# Patient Record
Sex: MALE | Race: WHITE
[De-identification: names, ages, dates, MRNs, and addresses within clinical notes are randomized per-mention and may not be internally consistent; named-entity substitution may affect disease eponyms.]

---

## 2018-06-04 ENCOUNTER — HOSPITAL ENCOUNTER (OUTPATIENT)
Dept: HOSPITAL 17 - PHED | Age: 58
Setting detail: OBSERVATION
LOS: 1 days | Discharge: HOME | End: 2018-06-05
Attending: HOSPITALIST | Admitting: HOSPITALIST
Payer: OTHER GOVERNMENT

## 2018-06-04 VITALS — WEIGHT: 171.96 LBS | HEIGHT: 68 IN | BODY MASS INDEX: 26.06 KG/M2

## 2018-06-04 VITALS
SYSTOLIC BLOOD PRESSURE: 141 MMHG | DIASTOLIC BLOOD PRESSURE: 86 MMHG | OXYGEN SATURATION: 97 % | RESPIRATION RATE: 18 BRPM | HEART RATE: 72 BPM

## 2018-06-04 VITALS
DIASTOLIC BLOOD PRESSURE: 78 MMHG | RESPIRATION RATE: 16 BRPM | HEART RATE: 68 BPM | OXYGEN SATURATION: 96 % | SYSTOLIC BLOOD PRESSURE: 143 MMHG

## 2018-06-04 VITALS
RESPIRATION RATE: 16 BRPM | DIASTOLIC BLOOD PRESSURE: 77 MMHG | SYSTOLIC BLOOD PRESSURE: 119 MMHG | OXYGEN SATURATION: 97 % | HEART RATE: 73 BPM

## 2018-06-04 VITALS
SYSTOLIC BLOOD PRESSURE: 132 MMHG | RESPIRATION RATE: 18 BRPM | HEART RATE: 87 BPM | DIASTOLIC BLOOD PRESSURE: 81 MMHG | OXYGEN SATURATION: 98 %

## 2018-06-04 VITALS
RESPIRATION RATE: 18 BRPM | SYSTOLIC BLOOD PRESSURE: 118 MMHG | TEMPERATURE: 98.4 F | DIASTOLIC BLOOD PRESSURE: 70 MMHG | OXYGEN SATURATION: 98 % | HEART RATE: 83 BPM

## 2018-06-04 VITALS — HEART RATE: 77 BPM

## 2018-06-04 VITALS
RESPIRATION RATE: 20 BRPM | HEART RATE: 72 BPM | DIASTOLIC BLOOD PRESSURE: 76 MMHG | SYSTOLIC BLOOD PRESSURE: 137 MMHG | TEMPERATURE: 96.5 F | OXYGEN SATURATION: 97 %

## 2018-06-04 VITALS — OXYGEN SATURATION: 96 %

## 2018-06-04 DIAGNOSIS — F41.9: ICD-10-CM

## 2018-06-04 DIAGNOSIS — R01.1: ICD-10-CM

## 2018-06-04 DIAGNOSIS — R42: ICD-10-CM

## 2018-06-04 DIAGNOSIS — R06.02: ICD-10-CM

## 2018-06-04 DIAGNOSIS — E03.9: ICD-10-CM

## 2018-06-04 DIAGNOSIS — R07.89: Primary | ICD-10-CM

## 2018-06-04 DIAGNOSIS — R20.0: ICD-10-CM

## 2018-06-04 DIAGNOSIS — R11.0: ICD-10-CM

## 2018-06-04 DIAGNOSIS — K21.9: ICD-10-CM

## 2018-06-04 DIAGNOSIS — E78.5: ICD-10-CM

## 2018-06-04 DIAGNOSIS — Z79.899: ICD-10-CM

## 2018-06-04 DIAGNOSIS — F32.9: ICD-10-CM

## 2018-06-04 LAB
ALBUMIN SERPL-MCNC: 4 GM/DL (ref 3.4–5)
ALP SERPL-CCNC: 103 U/L (ref 45–117)
ALT SERPL-CCNC: 45 U/L (ref 12–78)
AST SERPL-CCNC: 24 U/L (ref 15–37)
BASOPHILS # BLD AUTO: 0.1 TH/MM3 (ref 0–0.2)
BASOPHILS NFR BLD: 1.2 % (ref 0–2)
BILIRUB SERPL-MCNC: 0.5 MG/DL (ref 0.2–1)
BUN SERPL-MCNC: 10 MG/DL (ref 7–18)
CALCIUM SERPL-MCNC: 8.7 MG/DL (ref 8.5–10.1)
CHLORIDE SERPL-SCNC: 105 MEQ/L (ref 98–107)
CREAT SERPL-MCNC: 0.94 MG/DL (ref 0.6–1.3)
D-DIMER: (no result) MG/L FEU (ref 0–0.5)
EOSINOPHIL # BLD: 0.2 TH/MM3 (ref 0–0.4)
EOSINOPHIL NFR BLD: 2.7 % (ref 0–4)
ERYTHROCYTE [DISTWIDTH] IN BLOOD BY AUTOMATED COUNT: 12.6 % (ref 11.6–17.2)
GFR SERPLBLD BASED ON 1.73 SQ M-ARVRAT: 82 ML/MIN (ref 89–?)
GLUCOSE SERPL-MCNC: 85 MG/DL (ref 74–106)
HCO3 BLD-SCNC: 25.6 MEQ/L (ref 21–32)
HCT VFR BLD CALC: 45.9 % (ref 39–51)
HGB BLD-MCNC: 16.1 GM/DL (ref 13–17)
INR PPP: 1 RATIO
LYMPHOCYTES # BLD AUTO: 2.8 TH/MM3 (ref 1–4.8)
LYMPHOCYTES NFR BLD AUTO: 40 % (ref 9–44)
MCH RBC QN AUTO: 31.6 PG (ref 27–34)
MCHC RBC AUTO-ENTMCNC: 35 % (ref 32–36)
MCV RBC AUTO: 90.1 FL (ref 80–100)
MONOCYTE #: 0.8 TH/MM3 (ref 0–0.9)
MONOCYTES NFR BLD: 10.7 % (ref 0–8)
NEUTROPHILS # BLD AUTO: 3.2 TH/MM3 (ref 1.8–7.7)
NEUTROPHILS NFR BLD AUTO: 45.4 % (ref 16–70)
PLATELET # BLD: 275 TH/MM3 (ref 150–450)
PMV BLD AUTO: 8.6 FL (ref 7–11)
PROT SERPL-MCNC: 7.9 GM/DL (ref 6.4–8.2)
PROTHROMBIN TIME: 10.5 SEC (ref 9.8–11.6)
RBC # BLD AUTO: 5.09 MIL/MM3 (ref 4.5–5.9)
SODIUM SERPL-SCNC: 138 MEQ/L (ref 136–145)
TROPONIN I SERPL-MCNC: (no result) NG/ML (ref 0.02–0.05)
WBC # BLD AUTO: 7.1 TH/MM3 (ref 4–11)

## 2018-06-04 PROCEDURE — 93005 ELECTROCARDIOGRAM TRACING: CPT

## 2018-06-04 PROCEDURE — 85610 PROTHROMBIN TIME: CPT

## 2018-06-04 PROCEDURE — G0378 HOSPITAL OBSERVATION PER HR: HCPCS

## 2018-06-04 PROCEDURE — 82550 ASSAY OF CK (CPK): CPT

## 2018-06-04 PROCEDURE — 85379 FIBRIN DEGRADATION QUANT: CPT

## 2018-06-04 PROCEDURE — 82552 ASSAY OF CPK IN BLOOD: CPT

## 2018-06-04 PROCEDURE — 85025 COMPLETE CBC W/AUTO DIFF WBC: CPT

## 2018-06-04 PROCEDURE — 83690 ASSAY OF LIPASE: CPT

## 2018-06-04 PROCEDURE — 84484 ASSAY OF TROPONIN QUANT: CPT

## 2018-06-04 PROCEDURE — 99285 EMERGENCY DEPT VISIT HI MDM: CPT

## 2018-06-04 PROCEDURE — 80307 DRUG TEST PRSMV CHEM ANLYZR: CPT

## 2018-06-04 PROCEDURE — 80053 COMPREHEN METABOLIC PANEL: CPT

## 2018-06-04 PROCEDURE — 93017 CV STRESS TEST TRACING ONLY: CPT

## 2018-06-04 PROCEDURE — 85730 THROMBOPLASTIN TIME PARTIAL: CPT

## 2018-06-04 PROCEDURE — 83880 ASSAY OF NATRIURETIC PEPTIDE: CPT

## 2018-06-04 PROCEDURE — 71045 X-RAY EXAM CHEST 1 VIEW: CPT

## 2018-06-04 RX ADMIN — Medication SCH ML: at 22:33

## 2018-06-04 NOTE — RADRPT
EXAM DATE:  6/4/2018 4:46 PM EDT

AGE/SEX:        57 years / Male



INDICATIONS:  Substernal chest pain.



CLINICAL DATA:  This is the patient's initial encounter. Patient reports that signs and symptoms have
 been present for 1 day and indicates a pain score of 4/10. 

                                                                          

MEDICAL/SURGICAL HISTORY:       None. None.



COMPARISON:  No prior studies currently available.



FINDINGS:  

A single AP view of the chest demonstrates the lungs to be symmetrically aerated without evidence of 
mass, infiltrate or effusion.  The cardiomediastinal contours are unremarkable.  Osseous structures a
re intact.  





CONCLUSION: 

No acute disease



Electronically signed by: Jamie Daniels MD  6/4/2018 5:06 PM EDT

## 2018-06-04 NOTE — PD
HPI


Time Seen by Provider:  16:03


Travel History


International Travel<30 days:  No


Contact w/Intl Traveler<30days:  No


Traveled to known affect area:  No





History of Present Illness


HPI


Patient comes in complaining of substernal chest tightness radiating to his 

right upper extremity and associated with lightheadedness originally occurred 2 

weeks ago.  But it resolved spontaneously.  This episode returned at about 10 

AM today and has been constant (rated 6 out of 10 )ever since which prompted 

the patient to come into the emergency department for further evaluation after 

calling his cardiologist who recommended the same.  Patient denies any 

alleviating or aggravating factors.  Patient denies any associated factors such 

as fever, rash, sore throat, cough, shortness of breath, nausea, vomiting, 

diarrhea, abdominal pain.





pcp stephanie ocampo


States allergy to Septra


Past medical history significant for hypothyroidism, hernia repair, back surgery

, appendectomy, GERD, anxiety, hypercholesterolemia.





PFSH


Past Medical History


Hx Anticoagulant Therapy:  No


Anxiety:  Yes


Cancer:  No


Cardiovascular Problems:  No


Chemotherapy:  No


Cerebrovascular Accident:  No


Diabetes:  No


Diminished Hearing:  No


GERD:  Yes


Hepatitis:  No


Hiatal Hernia:  No


Hypertension:  No


Respiratory:  No


Thyroid Disease:  Yes





Past Surgical History


Abdominal Surgery:  Yes (HERNIA REPAIR)


Appendectomy:  Yes


Cardiac Surgery:  No


Ear Surgery:  No


Eye Surgery:  No


Genitourinary Surgery:  No


Hysterectomy:  No


Neurologic Surgery:  Yes (RECENT BACK SURGERY)


Oral Surgery:  No


Pacemaker:  No


Thoracic Surgery:  No


Other Surgery:  Yes (BACK SURGERY ABD HERNIA REPAIR)





Social History


Alcohol Use:  No


Tobacco Use:  No


Substance Use:  No





Allergies-Medications


(Allergen,Severity, Reaction):  


Coded Allergies:  


     Sulfa (Sulfonamide Antibiotics) (Unverified  Allergy, Severe, 6/4/18)


     trimethoprim (Unverified  Allergy, Severe, 6/4/18)


Uncoded Allergies:  


     NNKA (Allergy, Unknown, 9/9/03)


     SEPTRA (Allergy, Unknown, 9/9/03)


Reported Meds & Prescriptions





Reported Meds & Active Scripts


Active


Reported


Niacin 500 Mg Tab 500 Mg PO DAILY


Omega-3 Fish Oil/Vitamin (Fish Oil-Cholecalciferol) 1,000-1,000 Mg Cap 1 Cap PO 

DAILY


Vitamin E 200 Unit Cap 400 Units PO HS


Probiotic (Lactobacillus Acidophilus) 10 Billion Cell Cap 1 Cap PO HS


Potassium Gluconate 595 Mg (99 Mg) Tab 1 Tab PO HS


Multi-Vitamin Daily (Multiple Vitamin) 1 Tab Tab 1 Tab PO DAILY


Refresh Optive Sensitive Unit-Dose Opth Drops (Carboxymethylcellulose-Glycerine 

Opth Drops) 0.5-0.9% Drops 1-2 Drop EACH EYE Q6HR PRN


[place]away Opth Drops (Ketotifen Opth Drops) 0.025% Drops 1 Drop EACH EYE BID


Linzess (Linaclotide) 290 Mcg Cap 290 Mcg PO DAILY


Zofran (Ondansetron HCl) 4 Mg Tab 4 Mg PO Q8HR PRN


Lovastatin 20 Mg Tab 20 Mg PO HS


Venlafaxine ER 24 HR (Venlafaxine HCl) 150 Mg Cap 150 Mg PO DAILY


Lorazepam 0.5 Mg Tab 0.5 Mg PO Q8H PRN


Hydroxyzine HCl 25 Mg Tab 25 Mg PO HS PRN


Bethanechol 25 Mg Tab 25 Mg PO Q8HR


Oscimin (Hyoscyamine Sulfate) 0.125 Mg Sub 0.125 Mg PO Q6HR PRN


Levothyroxine (Levothyroxine Sodium) 100 Mcg Tab 100 Mcg PO DAILY


Omeprazole 20 Mg Tab 40 Mg PO DAILY








Review of Systems


General / Constitutional:  No: Fever


Eyes:  No: Visual changes


HENT:  No: Headaches


Cardiovascular:  Positive: Chest Pain or Discomfort


Respiratory:  No: Shortness of Breath


Gastrointestinal:  No: Abdominal Pain


Genitourinary:  No: Dysuria


Musculoskeletal:  No: Pain


Skin:  No Rash


Neurologic:  No: Weakness


Psychiatric:  No: Depression


Endocrine:  No: Polydipsia


Hematologic/Lymphatic:  No: Easy Bruising





Physical Exam


Narrative


GENERAL: Well-nourished, well-developed patient in no apparent distress.


SKIN: Warm and dry.


HEAD: Atraumatic. Normocephalic. 


EYES: Pupils equal and round. No scleral icterus. No injection or drainage. 


ENT: No nasal bleeding or discharge.  Mucous membranes pink and moist.


NECK: Trachea midline. No JVD. 


CARDIOVASCULAR: Regular rate and rhythm.  no rubs or gallops   


RESPIRATORY: No accessory muscle use. Clear to auscultation. Breath sounds 

equal bilaterally. 


GASTROINTESTINAL: Abdomen soft, non-tender, nondistended. No rebound or 

guarding 


MUSCULOSKELETAL: Extremities without clubbing, cyanosis, or edema. No obvious 

deformities. 


NEUROLOGICAL: Awake and alert. No obvious cranial nerve deficits.  Motor 

grossly within normal limits. Five out of 5 muscle strength in the arms and 

legs.  Normal speech.


PSYCHIATRIC: Appropriate mood and affect; insight and judgment normal.





Data


Data


Last Documented VS





Vital Signs








  Date Time  Temp Pulse Resp B/P (MAP) Pulse Ox O2 Delivery O2 Flow Rate FiO2


 


6/4/18 16:28      Room Air  


 


6/4/18 16:28     98   


 


6/4/18 16:23  87 18     


 


6/4/18 16:15 98.4       








Orders





 Orders


Electrocardiogram (6/4/18 16:03)


B-Type Natriuretic Peptide (6/4/18 16:03)


Ckmb (Isoenzyme) Profile (6/4/18 16:03)


Complete Blood Count With Diff (6/4/18 16:03)


Comprehensive Metabolic Panel (6/4/18 16:03)


D-Dimer (6/4/18 16:03)


Prothrombin Time / Inr (Pt) (6/4/18 16:03)


Act Partial Throm Time (Ptt) (6/4/18 16:03)


Troponin I (6/4/18 16:03)


Lipase (6/4/18 16:03)


Chest, Single Ap (6/4/18 16:03)


Ecg Monitoring (6/4/18 16:03)


Iv Access Insert/Monitor (6/4/18 16:03)


Oximetry (6/4/18 16:03)


Oxygen Administration (6/4/18 16:03)


Sodium Chloride 0.9% Flush (Ns Flush) (6/4/18 16:15)


Drug Screen, Random Urine (6/4/18 16:03)


Alcohol (Ethanol) (6/4/18 16:03)


Aspirin Chew (Aspirin Chew) (6/4/18 16:30)


Morphine Inj (Morphine Inj) (6/4/18 16:30)


Nitroglycerin 2% Oint (Nitroglycerin 2% (6/4/18 16:30)


CKMB (6/4/18 16:10)


CKMB% (6/4/18 16:10)





Labs





Laboratory Tests








Test


  6/4/18


16:10 6/4/18


17:20


 


White Blood Count 7.1 TH/MM3  


 


Red Blood Count 5.09 MIL/MM3  


 


Hemoglobin 16.1 GM/DL  


 


Hematocrit 45.9 %  


 


Mean Corpuscular Volume 90.1 FL  


 


Mean Corpuscular Hemoglobin 31.6 PG  


 


Mean Corpuscular Hemoglobin


Concent 35.0 % 


  


 


 


Red Cell Distribution Width 12.6 %  


 


Platelet Count 275 TH/MM3  


 


Mean Platelet Volume 8.6 FL  


 


Neutrophils (%) (Auto) 45.4 %  


 


Lymphocytes (%) (Auto) 40.0 %  


 


Monocytes (%) (Auto) 10.7 %  


 


Eosinophils (%) (Auto) 2.7 %  


 


Basophils (%) (Auto) 1.2 %  


 


Neutrophils # (Auto) 3.2 TH/MM3  


 


Lymphocytes # (Auto) 2.8 TH/MM3  


 


Monocytes # (Auto) 0.8 TH/MM3  


 


Eosinophils # (Auto) 0.2 TH/MM3  


 


Basophils # (Auto) 0.1 TH/MM3  


 


CBC Comment DIFF FINAL  


 


Differential Comment   


 


Prothrombin Time 10.5 SEC  


 


Prothromb Time International


Ratio 1.0 RATIO 


  


 


 


Activated Partial


Thromboplast Time 25.0 SEC 


  


 


 


D-Dimer Quantitative (PE/DVT)


  LESS THAN 0.19


MG/L FEU 


 


 


Blood Urea Nitrogen 10 MG/DL  


 


Creatinine 0.94 MG/DL  


 


Random Glucose 85 MG/DL  


 


Total Protein 7.9 GM/DL  


 


Albumin 4.0 GM/DL  


 


Calcium Level 8.7 MG/DL  


 


Alkaline Phosphatase 103 U/L  


 


Aspartate Amino Transf


(AST/SGOT) 24 U/L 


  


 


 


Alanine Aminotransferase


(ALT/SGPT) 45 U/L 


  


 


 


Total Bilirubin 0.5 MG/DL  


 


Sodium Level 138 MEQ/L  


 


Potassium Level 3.6 MEQ/L  


 


Chloride Level 105 MEQ/L  


 


Carbon Dioxide Level 25.6 MEQ/L  


 


Anion Gap 7 MEQ/L  


 


Estimat Glomerular Filtration


Rate 82 ML/MIN 


  


 


 


Total Creatine Kinase 132 U/L  


 


Creatine Kinase MB 1.0 NG/ML  


 


Troponin I


  LESS THAN 0.02


NG/ML 


 


 


B-Type Natriuretic Peptide 8 PG/ML  


 


Lipase 153 U/L  


 


Ethyl Alcohol Level


  LESS THAN 3


MG/DL 


 


 


Urine Barbiturates Screen  NEG 


 


Urine Amphetamines Screen  NEG 


 


Urine Benzodiazepines Screen  NEG 











MDM


Medical Decision Making


Medical Screen Exam Complete:  Yes


Emergency Medical Condition:  Yes


Medical Record Reviewed:  Yes


Interpretation(s)


Pulse ox with excellent pleth wave, on room air, rates between 97 and 100 which 

is within normal limits and does not suggest any hypoxemia





EKG shows sinus rhythm, 78 bpm, normal intervals, no evidence of any ST 

elevation MI pattern, however questionable ST-T changes in inferior leads, 

difficult to assess due to motion artifact


Differential Diagnosis


STEMI versus non-STEMI versus pulmonary embolus versus pericardial effusion 

versus pleural effusion versus pneumonia


Narrative Course


No leukocytosis, no anemia, normal platelet count, no left shift, coagulation 

profile within normal limits


D-dimer negative


Toxicology screen negative including alcohol


Electrodes are all within normal limits, normal kidney liver and pancreatic 

functions.


First set of cardiac enzymes negative





Diagnosis





 Primary Impression:  


 Chest pain rule out MI





Admitting Information


Admitting Physician Requests:  Tenzin Del Rio MD Jun 4, 2018 16:03

## 2018-06-05 VITALS
HEART RATE: 73 BPM | SYSTOLIC BLOOD PRESSURE: 121 MMHG | RESPIRATION RATE: 20 BRPM | TEMPERATURE: 96.6 F | OXYGEN SATURATION: 98 % | DIASTOLIC BLOOD PRESSURE: 70 MMHG

## 2018-06-05 VITALS
RESPIRATION RATE: 20 BRPM | SYSTOLIC BLOOD PRESSURE: 126 MMHG | HEART RATE: 75 BPM | OXYGEN SATURATION: 97 % | TEMPERATURE: 96.8 F | DIASTOLIC BLOOD PRESSURE: 74 MMHG

## 2018-06-05 VITALS
TEMPERATURE: 97 F | SYSTOLIC BLOOD PRESSURE: 130 MMHG | HEART RATE: 71 BPM | OXYGEN SATURATION: 97 % | RESPIRATION RATE: 19 BRPM | DIASTOLIC BLOOD PRESSURE: 76 MMHG

## 2018-06-05 RX ADMIN — Medication SCH ML: at 09:06

## 2018-06-05 NOTE — EKG
Date Performed: 2018       Time Performed: 19:32:48

 

PTAGE:      57 years

 

EKG:      Sinus rhythm 

 

 NORMAL ECG Since the 

 

 PREVIOUS TRACING            , no significant change noted PREVIOUS TRACIN2018 16.05

 

DOCTOR:   Leidy Corbett  Interpretating Date/Time  2018 16:13:11

## 2018-06-05 NOTE — EKG
Date Performed: 2018       Time Performed: 22:17:15

 

PTAGE:      57 years

 

EKG:      Sinus rhythm 

 

 NORMAL ECG Since the 

 

 PREVIOUS TRACING            , no significant change noted PREVIOUS TRACIN2018 19.32

 

DOCTOR:   Leidy Corbett  Interpretating Date/Time  2018 16:13:21

## 2018-06-05 NOTE — EKG
Date Performed: 2018       Time Performed: 16:05:37

 

PTAGE:      57 years

 

EKG:      Sinus rhythm 

 

 NORMAL ECG INTERPRETATION BASED ON A DEFAULT AGE OF 40 YEARS Since the 

 

 PREVIOUS TRACING            , no significant change noted PREVIOUS TRACIN2014 15.48

 

DOCTOR:   Leidy Corbett  Interpretating Date/Time  2018 16:13:00

## 2018-06-05 NOTE — HHI.HP
__________________________________________________





HPI


Service


[facility]


Primary Care Physician


Rufus Null MD (Paul)


Admission Diagnosis





CP R/O MI


Diagnoses:  


(1) Chest pain


Diagnosis:  Principal





Chief Complaint:  


Chest pain


Travel History


International Travel<30 Days:  No


Contact w/Intl Traveler <30 Da:  No


Traveled to Known Affected Are:  No


History of Present Illness


57-year-old  male with known history of hyperlipidemia, anxiety who 

presented to the hospital because of chest pain.  Patient states that he has 

been having episodes of chest discomfort 1 approximately 10 days ago and then 

another one yesterday morning at 10 AM where he describes it as a heaviness in 

the middle part of his chest which is 7/10 on a pain scale and radiated up to 

his right arm where he was developing some numbness type sensation whenever he 

squeezes fist.  He had associated nausea, shortness of breath, lightheadedness.

  Patient denied any dyspnea on exertion, vomiting, diaphoresis.  Patient only 

has mild underlying cardiac issues with hyperlipidemia.  He has seen Dr. Lieberman in the past approximate 1 year ago and told he has a murmur.  He has 

not had any provocative testing.  [facility] 

who is managing his anxiety and depression.  Patient did have evaluation in the 

emergency department is recommended by the ER physician the patient be observed 

in the chest pain center.





Review of Systems


Constitutional:  COMPLAINS OF: Dizziness


Respiratory:  COMPLAINS OF: Shortness of breath


Cardiovascular:  COMPLAINS OF: Chest pain


Gastrointestinal:  COMPLAINS OF: Nausea


Except as stated in HPI:  all other systems reviewed are Neg





Past Family Social History


Past Medical History


Hyperlipidemia


Hypothyroidism


Anxiety/depression


Past Surgical History


Appendectomy


Cholecystectomy


Vasectomy with reversal


Left inguinal hernia repair


Lumbar sacral spine surgery with cyst removal


Right knee surgery


Reported Medications





Reported Meds & Active Scripts


Active


Reported


Niacin 500 Mg Tab 500 Mg PO DAILY


Omega-3 Fish Oil/Vitamin (Fish Oil-Cholecalciferol) 1,000-1,000 Mg Cap 1 Cap PO 

DAILY


Vitamin E 200 Unit Cap 400 Units PO HS


Probiotic (Lactobacillus Acidophilus) 10 Billion Cell Cap 1 Cap PO HS


Potassium Gluconate 595 Mg (99 Mg) Tab 1 Tab PO HS


Multi-Vitamin Daily (Multiple Vitamin) 1 Tab Tab 1 Tab PO DAILY


Refresh Optive Sensitive Unit-Dose Opth Drops (Carboxymethylcellulose-Glycerine 

Opth Drops) 0.5-0.9% Drops 1-2 Drop EACH EYE Q6HR PRN


[place]away Opth Drops (Ketotifen Opth Drops) 0.025% Drops 1 Drop EACH EYE BID


Linzess (Linaclotide) 290 Mcg Cap 290 Mcg PO DAILY


Zofran (Ondansetron HCl) 4 Mg Tab 4 Mg PO Q8HR PRN


Lovastatin 20 Mg Tab 20 Mg PO HS


Venlafaxine ER 24 HR (Venlafaxine HCl) 150 Mg Cap 150 Mg PO DAILY


Lorazepam 0.5 Mg Tab 0.5 Mg PO Q8H PRN


Hydroxyzine HCl 25 Mg Tab 25 Mg PO HS PRN


Bethanechol 25 Mg Tab 25 Mg PO Q8HR


Oscimin (Hyoscyamine Sulfate) 0.125 Mg Sub 0.125 Mg PO Q6HR PRN


Levothyroxine (Levothyroxine Sodium) 100 Mcg Tab 100 Mcg PO DAILY


Omeprazole 20 Mg Tab 40 Mg PO DAILY


Allergies:  


Coded Allergies:  


     Sulfa (Sulfonamide Antibiotics) (Unverified  Allergy, Severe, 18)


     trimethoprim (Unverified  Allergy, Severe, 18)


Uncoded Allergies:  


     NNKA (Allergy, Unknown, 03)


     SEPTRA (Allergy, Unknown, 03)


Family History


Family history is reviewed and significant for father  at age 70 from 

emphysema, mother still alive at age 91 in good health


Social History


Patient denies any tobacco, alcohol or illicit drugs.





Physical Exam


Vital Signs





Vital Signs








  Date Time  Temp Pulse Resp B/P (MAP) Pulse Ox O2 Delivery O2 Flow Rate FiO2


 


18 04:00 96.8 75 20 126/74 (91) 97   


 


18 00:00 96.6 73 20 121/70 (87) 98   


 


18 22:25     96   21


 


18 21:00  77      


 


18 20:31        


 


18 20:00 96.5 72 20 137/76 (96) 97   


 


18 19:00  72 18 141/86 (104) 97 Room Air  


 


18 18:10  68 16 143/78 (99) 96 Room Air  


 


18 17:15  73 16 119/77 (91) 97 Room Air  


 


18 16:28      Room Air  


 


18 16:28     98 Room Air  


 


18 16:28     98 Room Air  


 


18 16:23  87 18 132/81 (98) 98 Room Air  


 


18 16:15 98.4 83 18 118/70 (86) 98   








Physical Exam


GENERAL: Well-developed, well-nourished, in no acute distress. alert and 

orientated


HEENT: Head is normocephalic without any lesions or masses noted. Facial 

features are symmetric. Eyes: Pupils equal round reactive to light. Extraocular 

muscles are intact. Conjunctivae were clear. Oropharyngeal: Pharynx without any 

erythema edema. Tongue is midline without deviation. Buccal mucosa is moist 

without any masses or lesions


NECK: Supple without any masses. Trachea midline no deviation. No JVD, no 

bruits are appreciated


CARDIAC: Regular rhythm, regular rate. S1/S2 are heard. No murmurs gallops or 

rubs.


LUNGS: Clear to auscultation bilaterally. No wheeze, rhonchi or rales. No use 

of accessory muscles on inspiration or expiration.


ABDOMEN: Soft, nontender. Nondistended. Bowel sounds heard in all 4 quadrants. 

No organomegaly or masses. Negative rebound, negative guarding


EXTREMITIES: No edema, pulses are equal bilaterally. No cyanosis or clubbing


NEUROLOGY: Mood and affect appear appropriate. Cranial nerves II through XII 

grossly intact. Muscle strength 5/5 in upper and lower extremities bilaterally. 

Deep tendon reflexes are 2+ in upper and lower extremities bilaterally.


Laboratory





Laboratory Tests








Test


  18


16:10 18


17:20 18


19:18 18


22:20


 


White Blood Count 7.1    


 


Red Blood Count 5.09    


 


Hemoglobin 16.1    


 


Hematocrit 45.9    


 


Mean Corpuscular Volume 90.1    


 


Mean Corpuscular Hemoglobin 31.6    


 


Mean Corpuscular Hemoglobin


Concent 35.0 


  


  


  


 


 


Red Cell Distribution Width 12.6    


 


Platelet Count 275    


 


Mean Platelet Volume 8.6    


 


Neutrophils (%) (Auto) 45.4    


 


Lymphocytes (%) (Auto) 40.0    


 


Monocytes (%) (Auto) 10.7    


 


Eosinophils (%) (Auto) 2.7    


 


Basophils (%) (Auto) 1.2    


 


Neutrophils # (Auto) 3.2    


 


Lymphocytes # (Auto) 2.8    


 


Monocytes # (Auto) 0.8    


 


Eosinophils # (Auto) 0.2    


 


Basophils # (Auto) 0.1    


 


CBC Comment DIFF FINAL    


 


Differential Comment     


 


Prothrombin Time 10.5    


 


Prothromb Time International


Ratio 1.0 


  


  


  


 


 


Activated Partial


Thromboplast Time 25.0 


  


  


  


 


 


D-Dimer Quantitative (PE/DVT) LESS THAN 0.19    


 


Blood Urea Nitrogen 10    


 


Creatinine 0.94    


 


Random Glucose 85    


 


Total Protein 7.9    


 


Albumin 4.0    


 


Calcium Level 8.7    


 


Alkaline Phosphatase 103    


 


Aspartate Amino Transf


(AST/SGOT) 24 


  


  


  


 


 


Alanine Aminotransferase


(ALT/SGPT) 45 


  


  


  


 


 


Total Bilirubin 0.5    


 


Sodium Level 138    


 


Potassium Level 3.6    


 


Chloride Level 105    


 


Carbon Dioxide Level 25.6    


 


Anion Gap 7    


 


Estimat Glomerular Filtration


Rate 82 


  


  


  


 


 


Total Creatine Kinase 132   113  108 


 


Creatine Kinase MB 1.0   0.8  0.8 


 


Troponin I LESS THAN 0.02   LESS THAN 0.02  LESS THAN 0.02 


 


B-Type Natriuretic Peptide 8    


 


Lipase 153    


 


Ethyl Alcohol Level LESS THAN 3    


 


Urine Opiates Screen  NEG   


 


Urine Barbiturates Screen  NEG   


 


Urine Amphetamines Screen  NEG   


 


Urine Benzodiazepines Screen  NEG   


 


Urine Cocaine Screen  NEG   


 


Urine Cannabinoids Screen  NEG   








Result Diagram:  


18 1610                                                                    

            18 1610





Imaging





Last Impressions








Chest X-Ray 18 1603 Signed





Impressions: 





 CONCLUSION: 





 No acute disease





  





 











Caprini VTE Risk Assessment


Caprini VTE Risk Assessment:  No/Low Risk (score <= 1)


Caprini Risk Assessment Model











 Point Value = 1          Point Value = 2  Point Value = 3  Point Value = 5


 


Age 41-60


Minor surgery


BMI > 25 kg/m2


Swollen legs


Varicose veins


Pregnancy or postpartum


History of unexplained or recurrent


   spontaneous 


Oral contraceptives or hormone


   replacement


Sepsis (< 1 month)


Serious lung disease, including


   pneumonia (< 1 month)


Abnormal pulmonary function


Acute myocardial infarction


Congestive heart failure (< 1 month)


History of inflammatory bowel disease


Medical patient at bed rest Age 61-74


Arthroscopic surgery


Major open surgery (> 45 min)


Laparoscopic surgery (> 45 min)


Malignancy


Confined to bed (> 72 hours)


Immobilizing plaster cast


Central venous access Age >= 75


History of VTE


Family history of VTE


Factor V Leiden


Prothrombin 24226K


Lupus anticoagulant


Anticardiolipin antibodies


Elevated serum homocysteine


Heparin-induced thrombocytopenia


Other congenital or acquired


   thrombophilia Stroke (< 1 month)


Elective arthroplasty


Hip, pelvis, or leg fracture


Acute spinal cord injury (< 1 month)








Prophylaxis Regimen











   Total Risk


Factor Score Risk Level Prophylaxis Regimen


 


0-1      Low Early ambulation


 


2 Moderate Order ONE of the following:


*Sequential Compression Device (SCD)


*Heparin 5000 units SQ BID


 


3-4 Higher Order ONE of the following medications:


*Heparin 5000 units SQ TID


*Enoxaparin/Lovenox 40 mg SQ daily (WT < 150 kg, CrCl > 30 mL/min)


*Enoxaparin/Lovenox 30 mg SQ daily (WT < 150 kg, CrCl > 10-29 mL/min)


*Enoxaparin/Lovenox 30 mg SQ BID (WT < 150 kg, CrCl > 30 mL/min)


AND/OR


*Sequential Compression Device (SCD)


 


5 or more Highest Order ONE of the following medications:


*Heparin 5000 units SQ TID (Preferred with Epidurals)


*Enoxaparin/Lovenox 40 mg SQ daily (WT < 150 kg, CrCl > 30 mL/min)


*Enoxaparin/Lovenox 30 mg SQ daily (WT < 150 kg, CrCl > 10-29 mL/min)


*Enoxaparin/Lovenox 30 mg SQ BID (WT < 150 kg, CrCl > 30 mL/min)


AND


*Sequential Compression Device (SCD)











Assessment and Plan


Assessment and Plan


Chest pain, atypical


-Patient does have increased risk factors include age, male, hyper lipidemia


-Patient has been ruled out for acute coronary event with serial cardiac 

enzymes are negative


-Serial EKGs were performed and reviewed by myself which shows sinus rhythm 

without any changes


-Exercise stress test was performed and indicated no signs of ischemia


-Patient continued on aspirin, nitroglycerin as needed


-Continue monitor telemetry





Hyperlipidemia


-Home medications have been continued





Anxiety/depression


-Home medication will be continued





DVT prevention


-Low risk, early ambulation





Discharge disposition





Discharge home in stable condition





Activity: Ad neo.





Diet: Healthy heart diet





Medication per medication reconciliation





Follow-up with primary medical doctor in 1 week











French Weaver 2018 08:26

## 2018-06-05 NOTE — HHI.DCPOC
Discharge Care Plan


Diagnosis:  


(1) Chest pain


Goals to Promote Your Health


* To prevent worsening of your condition and complications


* To maintain your health at the optimal level


Directions to Meet Your Goals


*** Take your medications as prescribed


*** Follow your dietary instruction


*** Follow activity as directed








*** Keep your appointments as scheduled


*** Take your immunizations and boosters as scheduled


*** If your symptoms worsen call your PCP, if no PCP go to Urgent Care Center 

or Emergency Room***


*** Smoking is Dangerous to Your Health. Avoid second hand smoke***


***Call the 24-hour hour crisis hotline for domestic abuse at 1-529.404.9439***











French Weaver Jun 5, 2018 10:03

## 2018-06-12 NOTE — TR
Date Performed: 06/05/2018       Time Performed: 09:32:48

 

DOCTOR:      Elinor Perez 

 

DRUG LIST:     

CLINICAL HISTORY:      CHEST PAIN

REASON FOR TEST:      Chest pain

REASON FOR ENDING:      Completed Protocol

OBSERVATION:      Arrhythmia: None Chest Pain: None

CONCLUSION:      Patient tolerated JULES protocol with Total Exercise Time=11:02 Maximum FR=844 % Max
 HR Achieved=90.0% Maximum GX=958/82, Testing stopped secondary to goals acheived, During peak exerci
se, patient was asymptomatic, Upsloping ST segments, no signicant ST depressions. HR and BP appropria
te response to exercise, Recovery period HR and BP returned to baseline

COMMENTS:      No ischemia